# Patient Record
Sex: FEMALE | Race: WHITE | Employment: FULL TIME
[De-identification: names, ages, dates, MRNs, and addresses within clinical notes are randomized per-mention and may not be internally consistent; named-entity substitution may affect disease eponyms.]

---

## 2024-01-08 ENCOUNTER — APPOINTMENT (OUTPATIENT)
Facility: HOSPITAL | Age: 19
End: 2024-01-08
Payer: COMMERCIAL

## 2024-01-08 ENCOUNTER — HOSPITAL ENCOUNTER (EMERGENCY)
Facility: HOSPITAL | Age: 19
Discharge: HOME | End: 2024-01-08
Attending: EMERGENCY MEDICINE
Payer: COMMERCIAL

## 2024-01-08 VITALS
DIASTOLIC BLOOD PRESSURE: 78 MMHG | BODY MASS INDEX: 24.8 KG/M2 | HEART RATE: 95 BPM | WEIGHT: 140 LBS | OXYGEN SATURATION: 99 % | SYSTOLIC BLOOD PRESSURE: 131 MMHG | HEIGHT: 63 IN | RESPIRATION RATE: 14 BRPM | TEMPERATURE: 98.7 F

## 2024-01-08 DIAGNOSIS — K52.9 COLITIS: Primary | ICD-10-CM

## 2024-01-08 LAB
ANION GAP BLD CALC-SCNC: ABNORMAL MMOL/L (ref 10–20)
BASOPHILS # BLD: 0.1 K/UL (ref 0–0.1)
BASOPHILS NFR BLD: 1 % (ref 0–1)
CA-I BLD-MCNC: 1.17 MMOL/L (ref 1.12–1.32)
CHLORIDE BLD-SCNC: 105 MMOL/L (ref 98–107)
CREAT BLD-MCNC: 0.38 MG/DL (ref 0.6–1.3)
DIFFERENTIAL METHOD BLD: ABNORMAL
EOSINOPHIL # BLD: 0.1 K/UL (ref 0–0.4)
EOSINOPHIL NFR BLD: 1 % (ref 0–7)
ERYTHROCYTE [DISTWIDTH] IN BLOOD BY AUTOMATED COUNT: 13.1 % (ref 11.5–14.5)
GLUCOSE BLD-MCNC: 100 MG/DL (ref 65–100)
HCT VFR BLD AUTO: 42.6 % (ref 35–47)
HGB BLD-MCNC: 14.2 G/DL (ref 11.5–16)
IMM GRANULOCYTES # BLD AUTO: 0 K/UL (ref 0–0.04)
IMM GRANULOCYTES NFR BLD AUTO: 0 % (ref 0–0.5)
INR PPP: 1 (ref 0.9–1.1)
LYMPHOCYTES # BLD: 1.9 K/UL (ref 0.8–3.5)
LYMPHOCYTES NFR BLD: 12 % (ref 12–49)
MCH RBC QN AUTO: 27.5 PG (ref 26–34)
MCHC RBC AUTO-ENTMCNC: 33.3 G/DL (ref 30–36.5)
MCV RBC AUTO: 82.6 FL (ref 80–99)
MONOCYTES # BLD: 1 K/UL (ref 0–1)
MONOCYTES NFR BLD: 7 % (ref 5–13)
NEUTS SEG # BLD: 12 K/UL (ref 1.8–8)
NEUTS SEG NFR BLD: 79 % (ref 32–75)
NRBC # BLD: 0 K/UL (ref 0–0.01)
NRBC BLD-RTO: 0 PER 100 WBC
PLATELET # BLD AUTO: 214 K/UL (ref 150–400)
PMV BLD AUTO: 12.1 FL (ref 8.9–12.9)
POTASSIUM BLD-SCNC: 3.5 MMOL/L (ref 3.5–5.1)
PROTHROMBIN TIME: 10 SEC (ref 9–11.1)
RBC # BLD AUTO: 5.16 M/UL (ref 3.8–5.2)
SERVICE CMNT-IMP: ABNORMAL
SODIUM BLD-SCNC: 140 MMOL/L (ref 136–145)
WBC # BLD AUTO: 15.1 K/UL (ref 3.6–11)

## 2024-01-08 PROCEDURE — 99285 EMERGENCY DEPT VISIT HI MDM: CPT

## 2024-01-08 PROCEDURE — 74177 CT ABD & PELVIS W/CONTRAST: CPT

## 2024-01-08 PROCEDURE — 80047 BASIC METABLC PNL IONIZED CA: CPT

## 2024-01-08 PROCEDURE — 2580000003 HC RX 258: Performed by: EMERGENCY MEDICINE

## 2024-01-08 PROCEDURE — 85610 PROTHROMBIN TIME: CPT

## 2024-01-08 PROCEDURE — 85025 COMPLETE CBC W/AUTO DIFF WBC: CPT

## 2024-01-08 PROCEDURE — 36415 COLL VENOUS BLD VENIPUNCTURE: CPT

## 2024-01-08 PROCEDURE — 6360000004 HC RX CONTRAST MEDICATION: Performed by: EMERGENCY MEDICINE

## 2024-01-08 RX ORDER — 0.9 % SODIUM CHLORIDE 0.9 %
1000 INTRAVENOUS SOLUTION INTRAVENOUS ONCE
Status: COMPLETED | OUTPATIENT
Start: 2024-01-08 | End: 2024-01-08

## 2024-01-08 RX ORDER — FLUCONAZOLE 150 MG/1
150 TABLET ORAL ONCE
Qty: 1 TABLET | Refills: 0 | Status: SHIPPED | OUTPATIENT
Start: 2024-01-08 | End: 2024-01-08

## 2024-01-08 RX ORDER — AZITHROMYCIN 500 MG/1
500 TABLET, FILM COATED ORAL DAILY
Qty: 3 TABLET | Refills: 0 | Status: SHIPPED | OUTPATIENT
Start: 2024-01-08 | End: 2024-01-11

## 2024-01-08 RX ADMIN — IOPAMIDOL 100 ML: 755 INJECTION, SOLUTION INTRAVENOUS at 05:34

## 2024-01-08 RX ADMIN — SODIUM CHLORIDE 1000 ML: 9 INJECTION, SOLUTION INTRAVENOUS at 04:45

## 2024-01-08 ASSESSMENT — ENCOUNTER SYMPTOMS
RECTAL PAIN: 0
NAUSEA: 0
BLOOD IN STOOL: 1
VOMITING: 0
ABDOMINAL PAIN: 1
RESPIRATORY NEGATIVE: 1
EYES NEGATIVE: 1

## 2024-01-08 ASSESSMENT — LIFESTYLE VARIABLES
HOW OFTEN DO YOU HAVE A DRINK CONTAINING ALCOHOL: NEVER
HOW MANY STANDARD DRINKS CONTAINING ALCOHOL DO YOU HAVE ON A TYPICAL DAY: PATIENT DOES NOT DRINK

## 2024-01-08 ASSESSMENT — PAIN - FUNCTIONAL ASSESSMENT: PAIN_FUNCTIONAL_ASSESSMENT: 0-10

## 2024-01-08 ASSESSMENT — PAIN SCALES - GENERAL: PAINLEVEL_OUTOF10: 6

## 2024-01-08 ASSESSMENT — PAIN DESCRIPTION - LOCATION: LOCATION: ABDOMEN

## 2024-01-08 NOTE — ED TRIAGE NOTES
Pt arrives c/o abd pain and bloody stool. Sts at 740pm had intense abd cramping and had an episode of diarrhea with some red. Last BM at 330am described as dark red. Denies n/v/fevers, abd hx, or other urinary symptoms att.

## 2024-01-08 NOTE — ED NOTES
Pt educated on medication management and follow up. Verbalized understanding, no further questions att.    Iv removed, pt ambulated to waiting area without acute distress

## 2024-01-08 NOTE — ED PROVIDER NOTES
[facility] EMERGENCY DEPT  EMERGENCY DEPARTMENT ENCOUNTER      Pt Name: Yoselin Marina  MRN: [id number]  Birthdate 2005  Date of evaluation: 1/8/2024  Provider: Ok Kimbrough MD    CHIEF COMPLAINT       Chief Complaint   Patient presents with    Abdominal Pain    Rectal Bleeding         HISTORY OF PRESENT ILLNESS   (Location/Symptom, Timing/Onset, Context/Setting, Quality, Duration, Modifying Factors, Severity)  Note limiting factors.   18-year-old female with no pertinent medical history presents to the emergency department c/o lower abdominal pain and bloody stool that started tonight.  Patient states at 740pm had intense abdominal cramping and had an episode of diarrhea with some bright red blood.  She notes her last BM at 330am described as dark red.  Patient denies nausea, vomiting, fever, urinary symptoms.  She states that she is not on her menstrual cycle at this time    The history is provided by the patient.         Review of External Medical Records:     Nursing Notes were reviewed.    REVIEW OF SYSTEMS    (2-9 systems for level 4, 10 or more for level 5)     Review of Systems   Constitutional: Negative.    HENT: Negative.     Eyes: Negative.    Respiratory: Negative.     Cardiovascular: Negative.    Gastrointestinal:  Positive for abdominal pain and blood in stool. Negative for nausea, rectal pain and vomiting.   Genitourinary: Negative.    Musculoskeletal: Negative.    Skin: Negative.    Neurological: Negative.    Psychiatric/Behavioral: Negative.         Except as noted above the remainder of the review of systems was reviewed and negative.       PAST MEDICAL HISTORY   No past medical history on file.      SURGICAL HISTORY     No past surgical history on file.      CURRENT MEDICATIONS       Discharge Medication List as of 1/8/2024  6:05 AM          ALLERGIES     Cefdinir    FAMILY HISTORY     No family history on file.       SOCIAL HISTORY       Social History     Socioeconomic History    Marital

## 2024-01-08 NOTE — DISCHARGE INSTRUCTIONS
You were seen in the emergency department for abdominal pain and bloody stool.  The results of your tests were consistent with infectious colitis. Please take any medications prescribed at this visit as instructed.  Please follow-up with your PCP or return to the emergency department if you experience a worsening of symptoms or any new symptoms that are concerning to you.

## 2024-11-11 ENCOUNTER — OFFICE VISIT (OUTPATIENT)
Age: 19
End: 2024-11-11

## 2024-11-11 VITALS
HEART RATE: 90 BPM | BODY MASS INDEX: 28.88 KG/M2 | RESPIRATION RATE: 16 BRPM | SYSTOLIC BLOOD PRESSURE: 108 MMHG | TEMPERATURE: 98.4 F | OXYGEN SATURATION: 97 % | DIASTOLIC BLOOD PRESSURE: 76 MMHG | HEIGHT: 63 IN | WEIGHT: 163 LBS

## 2024-11-11 DIAGNOSIS — J02.9 SORE THROAT: ICD-10-CM

## 2024-11-11 DIAGNOSIS — B96.89 ACUTE BACTERIAL TONSILLITIS: Primary | ICD-10-CM

## 2024-11-11 DIAGNOSIS — J03.80 ACUTE BACTERIAL TONSILLITIS: Primary | ICD-10-CM

## 2024-11-11 LAB
STREP PYOGENES DNA, POC: NEGATIVE
VALID INTERNAL CONTROL, POC: NORMAL

## 2024-11-11 RX ORDER — AZITHROMYCIN 500 MG/1
500 TABLET, FILM COATED ORAL DAILY
Qty: 3 TABLET | Refills: 0 | Status: SHIPPED | OUTPATIENT
Start: 2024-11-11 | End: 2024-11-14

## 2024-11-11 RX ORDER — ESCITALOPRAM OXALATE 10 MG/1
TABLET ORAL
COMMUNITY
Start: 2024-11-05

## 2024-11-11 RX ORDER — SERTRALINE HYDROCHLORIDE 100 MG/1
200 TABLET, FILM COATED ORAL DAILY
COMMUNITY
Start: 2024-10-03

## 2024-11-11 RX ORDER — CLONIDINE HYDROCHLORIDE 0.1 MG/1
TABLET ORAL
COMMUNITY
Start: 2024-11-05

## 2024-11-11 ASSESSMENT — ENCOUNTER SYMPTOMS
EYES NEGATIVE: 1
RESPIRATORY NEGATIVE: 1
SORE THROAT: 1
ALLERGIC/IMMUNOLOGIC NEGATIVE: 1
GASTROINTESTINAL NEGATIVE: 1

## 2024-11-11 NOTE — PATIENT INSTRUCTIONS
Thank you for visiting [facility].    -Change toothbrush after 24 hour of antibiotic use**  -Avoid kissing or sharing beverages/utensils until all symptoms resolve    Bacterial Infection:  Antibiotic:  Take as prescribed on full stomach until you complete entire course and with a probiotic  Pain/fever/chills/body aches:  Tylenol every 4 hours OR Ibuprofen every 6 hours as needed  Sore Throat:  Lozenges, as needed.  Cepacol lozenges will help numb the throat  Chloraseptic spray also helps to numb throat pain  Salt water gargles to soothe throat pain with 1/2-1 tsp of Benadryl  Ice, popsicles, cold food to soothe throat    Please follow-up with primary care provider within 2-5 days if signs and symptoms have not resolved or worsened.    Please go immediately to the Emergency Department if you develop difficulty swallowing/breathing, respiratory distress, hoarse voice, drooling, difficulty opening jaw, stiff or swollen neck, shortness of breath, or uncontrollable fever/nausea/vomiting.

## 2024-11-11 NOTE — PROGRESS NOTES
2024   Yoselin Marina (: 2005) is a 19 y.o. female, New patient, here for evaluation of the following chief complaint(s):  Pharyngitis (Pt c/o low grade fever this weekend, red sores on throat since Saturday. States nephew just recovered from hand foot mouth, she had direct contact with him. She's taken otc pain meds with little relief. )     ASSESSMENT/PLAN:  Below is the assessment and plan developed based on review of pertinent history, physical exam, labs, studies, and medications.  1. Acute bacterial tonsillitis  -     azithromycin (ZITHROMAX) 500 MG tablet; Take 1 tablet by mouth daily for 3 days, Disp-3 tablet, R-0Normal  2. Sore throat  -     AMB POC STREP GO A DIRECT, DNA PROBE         Handout given with care instructions  2. OTC for symptom management. Increase fluid intake, ensure adequate nutritional intake.  3. Follow up with PCP as needed.  4. Go to ED with development of any acute symptoms.     Follow up:  Return if symptoms worsen or fail to improve.  Follow up immediately for any new, worsening or changes or if symptoms are not improving over the next 5-7 days.     SUBJECTIVE/OBJECTIVE:     Yoselin Marina is a 19 y.o. female who complains of sore throat for 4 days. She denies a history of chest pain, chills, dizziness, fatigue, fevers, myalgias, nausea, shortness of breath, and vomiting and denies a history of asthma.  Patient denies environmental/ seasonal allergies. Patient denies exposure to smoke / cigarettes.Patient denies exposure to  sick contacts . Patient also noted that OTC remedies did not help.         Diagnoses and all orders for this visit:  Sore throat  -     AMB POC STREP GO A DIRECT, DNA PROBE      Pharyngitis (Pt c/o low grade fever this weekend, red sores on throat since Saturday. States nephew just recovered from hand foot mouth, she had direct contact with him. She's taken otc pain meds with little relief. )      Results for orders placed or performed in visit on